# Patient Record
Sex: FEMALE | Race: BLACK OR AFRICAN AMERICAN | NOT HISPANIC OR LATINO | Employment: STUDENT | URBAN - METROPOLITAN AREA
[De-identification: names, ages, dates, MRNs, and addresses within clinical notes are randomized per-mention and may not be internally consistent; named-entity substitution may affect disease eponyms.]

---
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
## 2022-02-16 RX ORDER — FLUCONAZOLE 150 MG/1
150 TABLET ORAL DAILY
Qty: 2 TABLET | Refills: 1 | Status: SHIPPED | OUTPATIENT
Start: 2022-02-16

## 2022-02-16 NOTE — TELEPHONE ENCOUNTER
Patient called this am.  She is requesting an rx for Diflucan be sent to her preferred pharmacy.  [facility] [place] in [place]# [id number].  I have attached an order..

## 2022-04-06 DIAGNOSIS — N76.0 ACUTE VAGINITIS: Primary | ICD-10-CM

## 2022-04-06 RX ORDER — FLUCONAZOLE 150 MG/1
150 TABLET ORAL DAILY
Qty: 2 TABLET | Refills: 1 | Status: CANCELLED | OUTPATIENT
Start: 2022-04-06

## 2022-04-06 RX ORDER — FLUCONAZOLE 150 MG/1
150 TABLET ORAL
Qty: 2 TABLET | Refills: 0 | Status: SHIPPED | OUTPATIENT
Start: 2022-04-06 | End: 2022-04-10

## 2022-04-06 RX ORDER — METRONIDAZOLE 500 MG/1
500 TABLET ORAL 2 TIMES DAILY
Qty: 14 TABLET | Refills: 0 | Status: SHIPPED | OUTPATIENT
Start: 2022-04-06 | End: 2022-04-13

## 2022-04-06 NOTE — TELEPHONE ENCOUNTER
Patient called this am.  She is requesting something for yeast infection.  She states itching, very uncomfortable..Diflucan was last filled on 2-16-22.  No appointment made.  Patient is currently in [facility] [place].  I have attached an order.

## 2024-12-10 ENCOUNTER — OFFICE VISIT (OUTPATIENT)
Dept: INTERNAL MEDICINE | Facility: CLINIC | Age: 24
End: 2024-12-10
Payer: COMMERCIAL

## 2024-12-10 ENCOUNTER — LAB (OUTPATIENT)
Dept: INTERNAL MEDICINE | Facility: CLINIC | Age: 24
End: 2024-12-10
Payer: COMMERCIAL

## 2024-12-10 VITALS
DIASTOLIC BLOOD PRESSURE: 70 MMHG | RESPIRATION RATE: 20 BRPM | BODY MASS INDEX: 26.19 KG/M2 | TEMPERATURE: 98.1 F | SYSTOLIC BLOOD PRESSURE: 124 MMHG | HEIGHT: 63 IN | WEIGHT: 147.8 LBS | OXYGEN SATURATION: 98 % | HEART RATE: 94 BPM

## 2024-12-10 DIAGNOSIS — G43.909 MIGRAINE WITHOUT STATUS MIGRAINOSUS, NOT INTRACTABLE, UNSPECIFIED MIGRAINE TYPE: Chronic | ICD-10-CM

## 2024-12-10 DIAGNOSIS — Z13.29 SCREENING FOR ENDOCRINE DISORDER: ICD-10-CM

## 2024-12-10 DIAGNOSIS — Z13.0 SCREENING FOR DEFICIENCY ANEMIA: ICD-10-CM

## 2024-12-10 DIAGNOSIS — E55.9 VITAMIN D DEFICIENCY: ICD-10-CM

## 2024-12-10 DIAGNOSIS — Z13.220 SCREENING, LIPID: ICD-10-CM

## 2024-12-10 DIAGNOSIS — Z13.1 SCREENING FOR DIABETES MELLITUS: ICD-10-CM

## 2024-12-10 DIAGNOSIS — Z00.00 ROUTINE GENERAL MEDICAL EXAMINATION AT A HEALTH CARE FACILITY: ICD-10-CM

## 2024-12-10 DIAGNOSIS — J45.20 MILD INTERMITTENT ASTHMA, UNSPECIFIED WHETHER COMPLICATED: Chronic | ICD-10-CM

## 2024-12-10 DIAGNOSIS — F41.9 ANXIETY: Chronic | ICD-10-CM

## 2024-12-10 DIAGNOSIS — F32.A DEPRESSION, UNSPECIFIED DEPRESSION TYPE: Chronic | ICD-10-CM

## 2024-12-10 DIAGNOSIS — Z00.00 ROUTINE GENERAL MEDICAL EXAMINATION AT A HEALTH CARE FACILITY: Primary | ICD-10-CM

## 2024-12-10 PROBLEM — L98.9 SKIN DISEASE: Status: RESOLVED | Noted: 2021-07-28 | Resolved: 2024-12-10

## 2024-12-10 PROBLEM — J01.80 OTHER ACUTE SINUSITIS: Status: RESOLVED | Noted: 2021-07-28 | Resolved: 2024-12-10

## 2024-12-10 PROBLEM — R06.02 SHORTNESS OF BREATH: Status: RESOLVED | Noted: 2021-07-28 | Resolved: 2024-12-10

## 2024-12-10 PROBLEM — J45.909 ASTHMA: Chronic | Status: ACTIVE | Noted: 2021-07-28

## 2024-12-10 PROBLEM — J34.9 SINUS TROUBLE: Status: RESOLVED | Noted: 2021-07-28 | Resolved: 2024-12-10

## 2024-12-10 LAB
BASOPHILS # BLD AUTO: 0.04 10*3/MM3 (ref 0–0.2)
BASOPHILS NFR BLD AUTO: 0.7 % (ref 0–1.5)
DEPRECATED RDW RBC AUTO: 39.8 FL (ref 37–54)
EOSINOPHIL # BLD AUTO: 0.04 10*3/MM3 (ref 0–0.4)
EOSINOPHIL NFR BLD AUTO: 0.7 % (ref 0.3–6.2)
ERYTHROCYTE [DISTWIDTH] IN BLOOD BY AUTOMATED COUNT: 12.8 % (ref 12.3–15.4)
HCT VFR BLD AUTO: 39.9 % (ref 34–46.6)
HGB BLD-MCNC: 13 G/DL (ref 12–15.9)
IMM GRANULOCYTES # BLD AUTO: 0.01 10*3/MM3 (ref 0–0.05)
IMM GRANULOCYTES NFR BLD AUTO: 0.2 % (ref 0–0.5)
LYMPHOCYTES # BLD AUTO: 1.52 10*3/MM3 (ref 0.7–3.1)
LYMPHOCYTES NFR BLD AUTO: 27.1 % (ref 19.6–45.3)
MCH RBC QN AUTO: 28.2 PG (ref 26.6–33)
MCHC RBC AUTO-ENTMCNC: 32.6 G/DL (ref 31.5–35.7)
MCV RBC AUTO: 86.6 FL (ref 79–97)
MONOCYTES # BLD AUTO: 0.66 10*3/MM3 (ref 0.1–0.9)
MONOCYTES NFR BLD AUTO: 11.8 % (ref 5–12)
NEUTROPHILS NFR BLD AUTO: 3.33 10*3/MM3 (ref 1.7–7)
NEUTROPHILS NFR BLD AUTO: 59.5 % (ref 42.7–76)
NRBC BLD AUTO-RTO: 0 /100 WBC (ref 0–0.2)
PLATELET # BLD AUTO: 375 10*3/MM3 (ref 140–450)
PMV BLD AUTO: 9.4 FL (ref 6–12)
RBC # BLD AUTO: 4.61 10*6/MM3 (ref 3.77–5.28)
WBC NRBC COR # BLD AUTO: 5.6 10*3/MM3 (ref 3.4–10.8)

## 2024-12-10 PROCEDURE — 80061 LIPID PANEL: CPT | Performed by: PHYSICIAN ASSISTANT

## 2024-12-10 PROCEDURE — 82728 ASSAY OF FERRITIN: CPT | Performed by: PHYSICIAN ASSISTANT

## 2024-12-10 PROCEDURE — 83540 ASSAY OF IRON: CPT | Performed by: PHYSICIAN ASSISTANT

## 2024-12-10 PROCEDURE — 82746 ASSAY OF FOLIC ACID SERUM: CPT | Performed by: PHYSICIAN ASSISTANT

## 2024-12-10 PROCEDURE — 83036 HEMOGLOBIN GLYCOSYLATED A1C: CPT | Performed by: PHYSICIAN ASSISTANT

## 2024-12-10 PROCEDURE — 80050 GENERAL HEALTH PANEL: CPT | Performed by: PHYSICIAN ASSISTANT

## 2024-12-10 PROCEDURE — 99214 OFFICE O/P EST MOD 30 MIN: CPT | Performed by: PHYSICIAN ASSISTANT

## 2024-12-10 PROCEDURE — 84466 ASSAY OF TRANSFERRIN: CPT | Performed by: PHYSICIAN ASSISTANT

## 2024-12-10 PROCEDURE — 99385 PREV VISIT NEW AGE 18-39: CPT | Performed by: PHYSICIAN ASSISTANT

## 2024-12-10 PROCEDURE — 82607 VITAMIN B-12: CPT | Performed by: PHYSICIAN ASSISTANT

## 2024-12-10 PROCEDURE — 82306 VITAMIN D 25 HYDROXY: CPT | Performed by: PHYSICIAN ASSISTANT

## 2024-12-10 PROCEDURE — 36415 COLL VENOUS BLD VENIPUNCTURE: CPT | Performed by: PHYSICIAN ASSISTANT

## 2024-12-10 RX ORDER — RIZATRIPTAN BENZOATE 10 MG/1
10 TABLET ORAL ONCE AS NEEDED
Qty: 8 TABLET | Refills: 0 | Status: SHIPPED | OUTPATIENT
Start: 2024-12-10

## 2024-12-10 RX ORDER — BUPROPION HYDROCHLORIDE 150 MG/1
150 TABLET ORAL EVERY MORNING
Qty: 90 TABLET | Refills: 1 | Status: SHIPPED | OUTPATIENT
Start: 2024-12-10

## 2024-12-10 RX ORDER — HYDROXYZINE HYDROCHLORIDE 10 MG/1
10 TABLET, FILM COATED ORAL EVERY 8 HOURS PRN
Qty: 90 TABLET | Refills: 1 | Status: SHIPPED | OUTPATIENT
Start: 2024-12-10

## 2024-12-10 RX ORDER — SERTRALINE HYDROCHLORIDE 100 MG/1
100 TABLET, FILM COATED ORAL DAILY
Qty: 90 TABLET | Refills: 1 | Status: SHIPPED | OUTPATIENT
Start: 2024-12-10

## 2024-12-10 RX ORDER — SERTRALINE HYDROCHLORIDE 100 MG/1
100 TABLET, FILM COATED ORAL DAILY
COMMUNITY
End: 2024-12-10 | Stop reason: SDUPTHER

## 2024-12-10 RX ORDER — BUPROPION HYDROCHLORIDE 150 MG/1
150 TABLET ORAL EVERY MORNING
COMMUNITY
End: 2024-12-10 | Stop reason: SDUPTHER

## 2024-12-10 RX ORDER — HYDROXYZINE HYDROCHLORIDE 10 MG/1
10 TABLET, FILM COATED ORAL EVERY 8 HOURS PRN
COMMUNITY
End: 2024-12-10 | Stop reason: SDUPTHER

## 2024-12-10 RX ORDER — AMITRIPTYLINE HYDROCHLORIDE 10 MG/1
10 TABLET ORAL NIGHTLY
Qty: 90 TABLET | Refills: 0 | Status: SHIPPED | OUTPATIENT
Start: 2024-12-10

## 2024-12-10 NOTE — PROGRESS NOTES
Chief Complaint  Anxiety and Depression    Subjective          History of Present Illness  Leonia Kearh Ireyetta Stinson presents to [facility] for a routine physical.    History of Present Illness  Here for routine physical, previously followed by student health.   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    Anx/Dep:  She has been on Zoloft 100 for the last year and started Wellbutrin Aug 2024 d/t worsening depression sx (lack of motivation, difficult to get out of bed in the morning) and weight gain. She feels like her sx were well controlled up until the last few weeks where she has had inc stress from work. She is not interested in changing her med at this time. No HI/SI. Has fhx of dep/anx in parents, no fhx of bipolar/schizophrenia.     Migraines:  Takes Excedrin prn for migraine. Has been having worse migraines lately, last migraine lasted 4 days. Gets a migraine up to 3 times a week, has light and sound sensitivity with it. No vomiting but has nausea.   Used to take cyproheptadine in middle school and it helped but made her drowsy.  Has not seen neuro in the past.  Tried imitrex but it did nothing.     Asthma:  She has a history of asthma and uses an inhaler as needed, particularly when exercising at the gym, is mostly exercise induced.    History of Present Illness      Diet/exercise: working on healthy diet and tries to have regular exercise.  Eye exams: utd, wears glasses  Dental exams: utd    Little interest or pleasure in doing things? Not at all   Feeling down, depressed, or hopeless? Over half   PHQ-2 Total Score 2   Trouble falling or staying asleep, or sleeping too much? Several days   Feeling tired or having little energy? Several days   Poor appetite or overeating? Not at all   Feeling bad about yourself - or that you are a failure or have let yourself or your family down? Not at all   Trouble concentrating on things, such as  reading the newspaper or watching television? Over half   Moving or speaking so slowly that other people could have noticed? Or the opposite - being so fidgety or restless that you have been moving around a lot more than usual? Not at all   Thoughts that you would be better off dead, or of hurting yourself in some way? Not at all   PHQ-9 Total Score 6   If you checked off any problems, how difficult have these problems made it for you to do your work, take care of things at home, or get along with other people? Somewhat difficult         Pap- has not had a pap in the past, is not sexually active. Did get HPV vaccine. Does not want pap at this time, has anx about the procedure.      reports that she has never smoked. She has never used smokeless tobacco.   Patient's last menstrual period was 11/13/2024 (approximate).   reports that she is not currently sexually active and has had partner(s) who are male. She reports using the following method of birth control/protection: Condom.  Cancer-related family history is not on file.    Health Maintenance   Topic Date Due    BMI FOLLOWUP  Never done    Pneumococcal Vaccine 0-64 (1 of 1 - PPSV23 or PCV20) 06/13/2008    HEPATITIS C SCREENING  Never done    PAP SMEAR  Never done    INFLUENZA VACCINE  07/01/2024    COVID-19 Vaccine (3 - 2024-25 season) 12/09/2025 (Originally 9/1/2024)    TDAP/TD VACCINES (2 - Td or Tdap) 12/10/2025 (Originally 8/5/2023)    ANNUAL PHYSICAL  12/10/2025    MENINGOCOCCAL VACCINE  Completed    HPV VACCINES  Completed       Immunization History   Administered Date(s) Administered    COVID-19 (PFIZER) Purple Cap Monovalent 07/27/2021, 08/17/2021    DTaP 2002, 2002, 01/09/2003, 06/14/2006    Fluzone (or Fluarix & Flulaval for VFC) >6mos 10/22/2021, 11/29/2022    Hep A, 2 Dose 01/07/2019    Hep B, Adolescent or Pediatric 2002, 05/07/2003    Hepatitis B Adult/Adolescent IM 2002, 01/09/2003    HiB 2002, 2002, 2002,  2002, 01/09/2003, 01/09/2003, 01/09/2004, 01/09/2004    Hib (PRP-OMP) 2002, 2002, 01/09/2003, 01/09/2004    Hib (PRP-T) 2002, 2002, 01/09/2003, 01/09/2004    Hpv9 11/30/2022, 01/19/2023, 05/12/2023    IPV 2002, 2002, 2002, 2002, 05/07/2003, 05/07/2003, 06/14/2006, 06/14/2006    Influenza TIV (IM) 10/01/2020    Influenza, Unspecified 10/01/2020, 10/01/2020    MMR 01/09/2004, 01/09/2004, 06/14/2006, 06/14/2006    MMRV 01/09/2004, 01/09/2004, 06/14/2006, 06/14/2006    Meningococcal Conjugate 08/05/2013, 08/05/2013, 01/07/2019, 01/07/2019    Meningococcal MCV4P (Menactra) 08/05/2013, 01/07/2019    Meningococcal, Unspecified 08/05/2013, 01/07/2019    Pneumococcal Conjugate 13-Valent (PCV13) 2002, 2002, 01/09/2003, 01/09/2003, 05/07/2003, 05/07/2003    Tdap 08/05/2013, 08/05/2013    Varicella 01/09/2004, 01/09/2004, 08/05/2013, 08/05/2013       The following portions of the patient's history were reviewed and updated as appropriate: allergies, current medications, past family history, past medical history, past social history, past surgical history and problem list.    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 For migraine prevention, Disp: 90 tablet, Rfl: 0    rizatriptan (Maxalt) 10 MG tablet, Take 1 tablet by mouth 1 (One) Time As Needed for Migraine. Once per day, Disp: 8 tablet, Rfl: 0  New Medications Ordered This Visit   Medications    buPROPion XL (WELLBUTRIN XL) 150 MG 24 hr tablet     Sig: Take 1 tablet by mouth Every Morning.     Dispense:  90 tablet     Refill:  1    hydrOXYzine (ATARAX) 10 MG tablet     Sig: Take 1 tablet by mouth Every 8 (Eight) Hours As Needed for Anxiety (insomnia).     Dispense:  90 tablet     Refill:  1    sertraline (ZOLOFT) 100 MG tablet     Sig: Take 1 tablet by mouth Daily.     Dispense:  90 tablet     Refill:  1    amitriptyline (ELAVIL) 10 MG tablet     Sig: Take 1 tablet by mouth Every Night. For migraine prevention     Dispense:  90 tablet     Refill:  0    rizatriptan (Maxalt) 10 MG tablet     Sig: Take 1 tablet by mouth 1 (One) Time As Needed for Migraine. Once per day     Dispense:  8 tablet     Refill:  0     Past Medical History:   Diagnosis Date    Acid reflux     Allergies     Anxiety     Asthma     Depression     Migraine     Seizures     Sinus trouble     SOB (shortness of breath)       History reviewed. No pertinent surgical history.   Family History   Problem Relation Age of Onset    Depression Mother     Anxiety disorder Mother     Migraine headaches Mother     Depression Father     Anxiety disorder Father     Alcohol abuse Father     Hyperlipidemia Maternal Grandmother     Heart disease Maternal Grandmother       Social History     Socioeconomic History    Marital status: Single   Tobacco Use    Smoking status: Never    Smokeless tobacco: Never   Vaping Use    Vaping status: Never Used   Substance and Sexual Activity    Alcohol use: Yes     Comment: rare    Drug use: Never    Sexual activity: Not Currently     Partners: Male     Birth control/protection: Condom        Objective  "  Vital Signs:   Vitals:    12/10/24 1411   BP: 124/70   Pulse: 94   Resp: 20   Temp: 98.1 °F (36.7 °C)   TempSrc: Temporal   SpO2: 98%   Weight: 67 kg (147 lb 12.8 oz)   Height: 160 cm (63\")   PainSc: 0-No pain      Body mass index is 26.18 kg/m².  BMI is >= 25 and <30. (Overweight) The following options were offered after discussion;: exercise counseling/recommendations and nutrition counseling/recommendations      Physical Exam  Vitals reviewed.   Constitutional:       General: She is not in acute distress.     Appearance: Normal appearance. She is not ill-appearing.   HENT:      Head: Normocephalic and atraumatic.      Right Ear: Tympanic membrane, ear canal and external ear normal.      Left Ear: Tympanic membrane, ear canal and external ear normal.      Mouth/Throat:      Mouth: Mucous membranes are moist.      Pharynx: No oropharyngeal exudate or posterior oropharyngeal erythema.   Eyes:      General: No scleral icterus.     Extraocular Movements: Extraocular movements intact.      Conjunctiva/sclera: Conjunctivae normal.      Pupils: Pupils are equal, round, and reactive to light.   Neck:      Thyroid: No thyromegaly.   Cardiovascular:      Rate and Rhythm: Normal rate and regular rhythm.      Pulses: Normal pulses.      Heart sounds: Normal heart sounds. No murmur heard.  Pulmonary:      Effort: Pulmonary effort is normal. No respiratory distress.      Breath sounds: Normal breath sounds. No stridor. No wheezing, rhonchi or rales.   Abdominal:      General: Bowel sounds are normal. There is no distension.      Palpations: Abdomen is soft. There is no mass.      Tenderness: There is no abdominal tenderness. There is no guarding.   Musculoskeletal:         General: No signs of injury. Normal range of motion.      Cervical back: Normal range of motion and neck supple.      Right lower leg: No edema.      Left lower leg: No edema.   Lymphadenopathy:      Cervical: No cervical adenopathy.   Skin:     General: " Skin is warm and dry.      Coloration: Skin is not jaundiced.      Findings: No rash.   Neurological:      General: No focal deficit present.      Mental Status: She is alert and oriented to person, place, and time.      Gait: Gait normal.   Psychiatric:         Mood and Affect: Mood normal.         Behavior: Behavior normal.          Result Review :            Results               Assessment and Plan    Diagnoses and all orders for this visit:    1. Routine general medical examination at a health care facility (Primary)  -     Hemoglobin A1c; Future  -     Comprehensive Metabolic Panel; Future  -     Lipid Panel; Future  -     CBC Auto Differential; Future  -     TSH Rfx On Abnormal To Free T4; Future  -     Iron Profile; Future  -     Ferritin; Future  -     Vitamin D,25-Hydroxy; Future  -     Vitamin B12 & Folate; Future    2. Migraine without status migrainosus, not intractable, unspecified migraine type  Assessment & Plan:  Chronic, uncontrolled, start elavil 10, plan to inc as needed. Cont Excedrine migraine, rx maxalt for prn migraine, tried/failed imitrex. Gave samples of nurtec to try as well.  If sx worsen consider referral to neuro    Orders:  -     amitriptyline (ELAVIL) 10 MG tablet; Take 1 tablet by mouth Every Night. For migraine prevention  Dispense: 90 tablet; Refill: 0  -     rizatriptan (Maxalt) 10 MG tablet; Take 1 tablet by mouth 1 (One) Time As Needed for Migraine. Once per day  Dispense: 8 tablet; Refill: 0    3. Anxiety  Assessment & Plan:  Chronic, stable, cont wellbutrin 150, zoloft 100, atarax prn.    Orders:  -     buPROPion XL (WELLBUTRIN XL) 150 MG 24 hr tablet; Take 1 tablet by mouth Every Morning.  Dispense: 90 tablet; Refill: 1  -     hydrOXYzine (ATARAX) 10 MG tablet; Take 1 tablet by mouth Every 8 (Eight) Hours As Needed for Anxiety (insomnia).  Dispense: 90 tablet; Refill: 1  -     sertraline (ZOLOFT) 100 MG tablet; Take 1 tablet by mouth Daily.  Dispense: 90 tablet; Refill:  1    4. Depression, unspecified depression type  Assessment & Plan:  Chronic, stable, cont wellbutrin 150, zoloft 100, atarax prn.    Orders:  -     buPROPion XL (WELLBUTRIN XL) 150 MG 24 hr tablet; Take 1 tablet by mouth Every Morning.  Dispense: 90 tablet; Refill: 1  -     hydrOXYzine (ATARAX) 10 MG tablet; Take 1 tablet by mouth Every 8 (Eight) Hours As Needed for Anxiety (insomnia).  Dispense: 90 tablet; Refill: 1  -     sertraline (ZOLOFT) 100 MG tablet; Take 1 tablet by mouth Daily.  Dispense: 90 tablet; Refill: 1    5. Mild intermittent asthma, unspecified whether complicated  Assessment & Plan:  Chronic, stable, cont alb prn.            6. Screening for deficiency anemia  -     CBC Auto Differential; Future  -     Iron Profile; Future  -     Ferritin; Future  -     Vitamin B12 & Folate; Future    7. Screening for endocrine disorder  -     TSH Rfx On Abnormal To Free T4; Future    8. Screening for diabetes mellitus  -     Hemoglobin A1c; Future  -     Comprehensive Metabolic Panel; Future    9. Screening, lipid  -     Lipid Panel; Future    10. Vitamin D deficiency  -     Vitamin D,25-Hydroxy; Future      Assessment & Plan          Follow Up   Return in about 4 weeks (around 1/7/2025) for migraine, anx/dep..    Counseled on health maintenance topics and preventative care recommendations. Follow up yearly for routine physical exam. Diet and exercise counseling given. See dentist and eye doctor yearly as directed.    If a referral was made please contact our office if you have not heard about an appointment in the next 2 weeks.   If labs or images are ordered we will contact you with the results within the next week.  If you have not heard from us after a week please call our office to inquire about the results.    Gricel Castro PA-C     Patient was given instructions and counseling regarding her condition or for health maintenance advice. Please see specific information pulled into the AVS if appropriate.      Patient or patient representative verbalized consent for the use of Ambient Listening during the visit with  Gricel Castro PA-C for chart documentation. 12/10/2024  15:32 EST

## 2024-12-10 NOTE — ASSESSMENT & PLAN NOTE
Chronic, uncontrolled, start elavil 10, plan to inc as needed. Cont Excedrine migraine, rx maxalt for prn migraine, tried/failed imitrex. Gave samples of nurtec to try as well.  If sx worsen consider referral to neuro

## 2024-12-11 LAB
25(OH)D3 SERPL-MCNC: 26.2 NG/ML (ref 30–100)
ALBUMIN SERPL-MCNC: 4.5 G/DL (ref 3.5–5.2)
ALBUMIN/GLOB SERPL: 1.6 G/DL
ALP SERPL-CCNC: 70 U/L (ref 39–117)
ALT SERPL W P-5'-P-CCNC: 18 U/L (ref 1–33)
ANION GAP SERPL CALCULATED.3IONS-SCNC: 10 MMOL/L (ref 5–15)
AST SERPL-CCNC: 25 U/L (ref 1–32)
BILIRUB SERPL-MCNC: 0.3 MG/DL (ref 0–1.2)
BUN SERPL-MCNC: 6 MG/DL (ref 6–20)
BUN/CREAT SERPL: 7.8 (ref 7–25)
CALCIUM SPEC-SCNC: 9.8 MG/DL (ref 8.6–10.5)
CHLORIDE SERPL-SCNC: 103 MMOL/L (ref 98–107)
CHOLEST SERPL-MCNC: 212 MG/DL (ref 0–200)
CO2 SERPL-SCNC: 26 MMOL/L (ref 22–29)
CREAT SERPL-MCNC: 0.77 MG/DL (ref 0.57–1)
EGFRCR SERPLBLD CKD-EPI 2021: 112 ML/MIN/1.73
FERRITIN SERPL-MCNC: 13.2 NG/ML (ref 13–150)
FOLATE SERPL-MCNC: 16.5 NG/ML (ref 4.78–24.2)
GLOBULIN UR ELPH-MCNC: 2.9 GM/DL
GLUCOSE SERPL-MCNC: 68 MG/DL (ref 65–99)
HBA1C MFR BLD: 4.7 % (ref 4.8–5.6)
HDLC SERPL-MCNC: 89 MG/DL (ref 40–60)
IRON 24H UR-MRATE: 53 MCG/DL (ref 37–145)
IRON SATN MFR SERPL: 12 % (ref 20–50)
LDLC SERPL CALC-MCNC: 99 MG/DL (ref 0–100)
LDLC/HDLC SERPL: 1.07 {RATIO}
POTASSIUM SERPL-SCNC: 4.2 MMOL/L (ref 3.5–5.2)
PROT SERPL-MCNC: 7.4 G/DL (ref 6–8.5)
SODIUM SERPL-SCNC: 139 MMOL/L (ref 136–145)
TIBC SERPL-MCNC: 435 MCG/DL (ref 298–536)
TRANSFERRIN SERPL-MCNC: 292 MG/DL (ref 200–360)
TRIGL SERPL-MCNC: 140 MG/DL (ref 0–150)
TSH SERPL DL<=0.05 MIU/L-ACNC: 1.63 UIU/ML (ref 0.27–4.2)
VIT B12 BLD-MCNC: 545 PG/ML (ref 211–946)
VLDLC SERPL-MCNC: 24 MG/DL (ref 5–40)

## 2024-12-13 ENCOUNTER — PATIENT ROUNDING (BHMG ONLY) (OUTPATIENT)
Dept: INTERNAL MEDICINE | Facility: CLINIC | Age: 24
End: 2024-12-13
Payer: COMMERCIAL

## 2024-12-13 NOTE — PROGRESS NOTES
My name is Hannah Hartmann and I am a patient experience representative for [facility] on [facility].    I would like to officially welcome you to our practice.  If you do not mind I would like to ask you a few questions about your recent visit with us.  If you do not have the time to reply that is perfectly fine.      First, could you tell me what went well with your recent visit?     Secondly, we are always looking for ways to make our patients' experiences even better. Do you have any recommendations on ways we may improve?     Third, safety is a top priority therefore do you have any concerns with that while in our office?    Finally, overall were you satisfied with your first visit to our practice?     Thank you for taking the time to answer a few questions today.  In the coming days you will receive a survey.  We encourage you to complete the survey to let us know how we did!        Hannah

## 2025-01-02 DIAGNOSIS — G43.909 MIGRAINE WITHOUT STATUS MIGRAINOSUS, NOT INTRACTABLE, UNSPECIFIED MIGRAINE TYPE: Chronic | ICD-10-CM

## 2025-01-02 RX ORDER — RIZATRIPTAN BENZOATE 10 MG/1
10 TABLET ORAL ONCE AS NEEDED
Qty: 8 TABLET | Refills: 0 | OUTPATIENT
Start: 2025-01-02

## 2025-01-15 DIAGNOSIS — F41.9 ANXIETY: Chronic | ICD-10-CM

## 2025-01-15 DIAGNOSIS — F32.A DEPRESSION, UNSPECIFIED DEPRESSION TYPE: Chronic | ICD-10-CM

## 2025-01-15 RX ORDER — SERTRALINE HYDROCHLORIDE 100 MG/1
150 TABLET, FILM COATED ORAL DAILY
Qty: 135 TABLET | Refills: 1 | Status: SHIPPED | OUTPATIENT
Start: 2025-01-15

## 2025-03-08 DIAGNOSIS — G43.909 MIGRAINE WITHOUT STATUS MIGRAINOSUS, NOT INTRACTABLE, UNSPECIFIED MIGRAINE TYPE: Chronic | ICD-10-CM

## 2025-03-10 RX ORDER — AMITRIPTYLINE HYDROCHLORIDE 10 MG/1
10 TABLET ORAL NIGHTLY
Qty: 30 TABLET | Refills: 0 | Status: SHIPPED | OUTPATIENT
Start: 2025-03-10

## 2025-03-10 NOTE — TELEPHONE ENCOUNTER
Left message letting patient know she is due now for a follow up with Gricel.  Sent in 30 day fill so she can make an appointment.

## 2025-03-21 ENCOUNTER — OFFICE VISIT (OUTPATIENT)
Dept: INTERNAL MEDICINE | Facility: CLINIC | Age: 25
End: 2025-03-21
Payer: COMMERCIAL

## 2025-03-21 VITALS
TEMPERATURE: 97.8 F | DIASTOLIC BLOOD PRESSURE: 72 MMHG | WEIGHT: 151 LBS | OXYGEN SATURATION: 98 % | HEIGHT: 63 IN | SYSTOLIC BLOOD PRESSURE: 104 MMHG | RESPIRATION RATE: 20 BRPM | BODY MASS INDEX: 26.75 KG/M2 | HEART RATE: 107 BPM

## 2025-03-21 DIAGNOSIS — J30.9 ALLERGIC RHINITIS, UNSPECIFIED SEASONALITY, UNSPECIFIED TRIGGER: ICD-10-CM

## 2025-03-21 DIAGNOSIS — G43.909 MIGRAINE WITHOUT STATUS MIGRAINOSUS, NOT INTRACTABLE, UNSPECIFIED MIGRAINE TYPE: Primary | ICD-10-CM

## 2025-03-21 DIAGNOSIS — F32.A DEPRESSION, UNSPECIFIED DEPRESSION TYPE: ICD-10-CM

## 2025-03-21 DIAGNOSIS — F41.9 ANXIETY: ICD-10-CM

## 2025-03-21 PROCEDURE — 99214 OFFICE O/P EST MOD 30 MIN: CPT | Performed by: PHYSICIAN ASSISTANT

## 2025-03-21 RX ORDER — FLUTICASONE PROPIONATE 50 MCG
2 SPRAY, SUSPENSION (ML) NASAL DAILY
Qty: 16 G | Refills: 2 | Status: SHIPPED | OUTPATIENT
Start: 2025-03-21

## 2025-03-21 RX ORDER — TOPIRAMATE 25 MG/1
25 TABLET, FILM COATED ORAL DAILY
Qty: 30 TABLET | Refills: 2 | Status: SHIPPED | OUTPATIENT
Start: 2025-03-21

## 2025-03-21 NOTE — ASSESSMENT & PLAN NOTE
Chronic, uncontrolled, d/c elavil, Cont Excedrine migraine, rx nurtec for prn migraine, tried/failed imitrex and maxalt.   Will refer to neurology  Orders:    rimegepant sulfate ODT (Nurtec) 75 MG disintegrating tablet; Place 1 tablet under the tongue 1 (One) Time As Needed (migraine) for up to 1 dose.    topiramate (Topamax) 25 MG tablet; Take 1 tablet by mouth Daily.    Ambulatory Referral to Neurology

## 2025-03-21 NOTE — PROGRESS NOTES
Chief Complaint  Migraine and Depression    Subjective          History of Present Illness  Leonia Kearh Ireyetta Stinson presents to [facility] for   History of Present Illness  Anx/Dep:  Increased Zoloft to 150 2 months ago and feels like it helps with anxiety but still having some trouble with depression. She has isolated herself more in her apartment and isn't wanting to be social, having a hard time getting up and doing things in the morning. Would like to stop wellbutrin and try something else.   She lost her job about a month ago and feels like that contributed some to her mood but she was already feeling depressed before that.   Trying to work out to focus some with her health, she is having trouble with being able to loose weight. She wants to go to the gym more but needs motivation for this.   Takes atarax 20mg prn for anxiety a few times a week.   No HI/SI. Has fhx of dep/anx in parents, no fhx of bipolar/schizophrenia.      Migraines:  Takes Excedrin prn for migraine. Nurtec helped, the maxalt and imitrex were not very helpful.  Has been having worse migraines lately, last migraine lasted 4 days. Gets a migraine up to 3 times a week, has light and sound sensitivity with it. No vomiting but has nausea.   Used to take cyproheptadine in middle school and it helped but made her drowsy.  Has not seen neuro in the past.       Asthma:  She has a history of asthma and uses an inhaler as needed, particularly when exercising at the gym, is mostly exercise induced.    Depression  Presents for follow-up visit. Symptoms include excessive worry, hypersomnia, insomnia, malaise/fatigue, difficulty controlling mood and difficulty falling asleep. Patient is not experiencing: confusion, irritability, obsessions, dizziness and difficulty staying asleep.Symptoms occur constantly.  The severity of symptoms is moderate.  The symptoms are aggravated by social activities and work stress. The  patient sleeps 8 hours per night. Her past medical history is significant for depression. Compliance with medications is %.       The following portions of the patient's history were reviewed and updated as appropriate: allergies, current medications, past family history, past medical history, past social history, past surgical history and problem list.  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       Current Outpatient Medications:     albuterol sulfate  (90 Base) MCG/ACT inhaler, Inhale 2 puffs Every 4 (Four) Hours As Needed., Disp: , Rfl:     hydrOXYzine (ATARAX) 10 MG tablet, Take 1 tablet by mouth Every 8 (Eight) Hours As Needed for Anxiety (insomnia)., Disp: 90 tablet, Rfl: 1    sertraline (ZOLOFT) 100 MG tablet, Take 1.5 tablets by mouth Daily., Disp: 135 tablet, Rfl: 1    Cariprazine HCl (Vraylar) 1.5 MG capsule capsule, Take 1 capsule by mouth Daily., Disp: 30 capsule, Rfl: 0    fluticasone (FLONASE) 50 MCG/ACT nasal spray, Administer 2 sprays into the nostril(s) as directed by provider Daily., Disp: 16 g, Rfl: 2    rimegepant sulfate ODT (Nurtec) 75 MG disintegrating tablet, Place 1 tablet under the tongue 1 (One) Time As Needed (migraine) for up to 1 dose., Disp: 8 tablet, Rfl: 3    topiramate (Topamax) 25 MG tablet, Take 1 tablet by mouth Daily., Disp: 30 tablet, Rfl: 2  New Medications Ordered This Visit   Medications    Cariprazine HCl (Vraylar) 1.5 MG capsule capsule     Sig: Take 1 capsule by mouth Daily.     Dispense:  30 capsule     Refill:  0    rimegepant sulfate ODT (Nurtec) 75 MG disintegrating tablet     Sig: Place 1 tablet under the tongue 1 (One) Time As Needed (migraine) for up to 1 dose.     Dispense:  8 tablet     Refill:  3    topiramate (Topamax) 25 MG tablet     Sig: Take 1 tablet by mouth Daily.     Dispense:  30 tablet     Refill:  2    fluticasone (FLONASE) 50 MCG/ACT nasal spray  "    Sig: Administer 2 sprays into the nostril(s) as directed by provider Daily.     Dispense:  16 g     Refill:  2     Social History     Tobacco Use   Smoking Status Never   Smokeless Tobacco Never        Objective   Vital Signs:   Vitals:    03/21/25 1343   BP: 104/72   Pulse: 107   Resp: 20   Temp: 97.8 °F (36.6 °C)   TempSrc: Temporal   SpO2: 98%   Weight: 68.5 kg (151 lb)   Height: 160 cm (63\")   PainSc: 0-No pain      Body mass index is 26.75 kg/m².  Physical Exam  Vitals reviewed.   Constitutional:       General: She is not in acute distress.     Appearance: Normal appearance.   HENT:      Head: Normocephalic and atraumatic.   Eyes:      General: No scleral icterus.     Extraocular Movements: Extraocular movements intact.      Conjunctiva/sclera: Conjunctivae normal.   Cardiovascular:      Rate and Rhythm: Normal rate and regular rhythm.      Heart sounds: Normal heart sounds. No murmur heard.  Pulmonary:      Effort: Pulmonary effort is normal. No respiratory distress.      Breath sounds: Normal breath sounds. No stridor. No wheezing or rhonchi.   Musculoskeletal:      Cervical back: Normal range of motion and neck supple.   Skin:     General: Skin is warm and dry.      Coloration: Skin is not jaundiced.   Neurological:      General: No focal deficit present.      Mental Status: She is alert and oriented to person, place, and time.      Gait: Gait normal.   Psychiatric:         Mood and Affect: Mood normal.         Behavior: Behavior normal.        Patient's last menstrual period was 03/17/2025 (exact date).         Result Review :                   Assessment and Plan       Migraine without status migrainosus, not intractable, unspecified migraine type  Chronic, uncontrolled, d/c elavil, Cont Excedrine migraine, rx nurtec for prn migraine, tried/failed imitrex and maxalt.   Will refer to neurology  Orders:    rimegepant sulfate ODT (Nurtec) 75 MG disintegrating tablet; Place 1 tablet under the tongue 1 " (One) Time As Needed (migraine) for up to 1 dose.    topiramate (Topamax) 25 MG tablet; Take 1 tablet by mouth Daily.    Ambulatory Referral to Neurology     Depression, unspecified depression type  Chronic, uncontrolled, will d/c wellbutrin, cont zoloft 150 and atarax prn, and start Vraylar   Orders:    Cariprazine HCl (Vraylar) 1.5 MG capsule capsule; Take 1 capsule by mouth Daily.     Anxiety  Chronic, uncontrolled, will d/c wellbutrin, cont zoloft 150 and atarax prn, and start Vraylar      Allergic rhinitis, unspecified seasonality, unspecified trigger    Orders:    fluticasone (FLONASE) 50 MCG/ACT nasal spray; Administer 2 sprays into the nostril(s) as directed by provider Daily.        Follow Up   Return in about 6 weeks (around 5/2/2025) for anx/dep, migraine.    Follow up if symptoms worsen or persist or has new or concerning symptoms, go to ER for severe symptoms.   Reviewed common medication effects and side effects and advised to report side effects immediately.   Encouraged medication compliance and the importance of keeping scheduled follow up appointments with me and any other providers.  If a referral was made please contact our office if you have not heard about an appointment in the next 2 weeks.   If labs or images are ordered we will contact you with the results within the next week.  If you have not heard from us after a week please call our office to inquire about the results.   Patient was given instructions and counseling regarding her condition and for health maintenance advice. Please see specific information pulled into the AVS if appropriate.   All questions were answered, the patient verbalized good understanding.     Gricel Castro PA-C    * Please note that portions of this note were completed with a voice recognition program.

## 2025-03-21 NOTE — ASSESSMENT & PLAN NOTE
Chronic, uncontrolled, will d/c wellbutrin, cont zoloft 150 and atarax prn, and start Vraylar   Orders:    Cariprazine HCl (Vraylar) 1.5 MG capsule capsule; Take 1 capsule by mouth Daily.

## 2025-07-18 DIAGNOSIS — F32.A DEPRESSION, UNSPECIFIED DEPRESSION TYPE: Chronic | ICD-10-CM

## 2025-07-18 DIAGNOSIS — F41.9 ANXIETY: Chronic | ICD-10-CM

## 2025-07-22 RX ORDER — SERTRALINE HYDROCHLORIDE 100 MG/1
150 TABLET, FILM COATED ORAL DAILY
Qty: 45 TABLET | Refills: 0 | Status: SHIPPED | OUTPATIENT
Start: 2025-07-22

## 2025-07-22 NOTE — TELEPHONE ENCOUNTER
Left message on voicemail letting patient know one month of medication was sent to the pharmacy to give her time to schedule a follow up.